# Patient Record
Sex: FEMALE | Race: WHITE | NOT HISPANIC OR LATINO
[De-identification: names, ages, dates, MRNs, and addresses within clinical notes are randomized per-mention and may not be internally consistent; named-entity substitution may affect disease eponyms.]

---

## 2018-08-29 PROBLEM — Z00.00 ENCOUNTER FOR PREVENTIVE HEALTH EXAMINATION: Status: ACTIVE | Noted: 2018-08-29

## 2018-09-13 ENCOUNTER — ASOB RESULT (OUTPATIENT)
Age: 28
End: 2018-09-13

## 2018-09-13 ENCOUNTER — APPOINTMENT (OUTPATIENT)
Dept: ANTEPARTUM | Facility: CLINIC | Age: 28
End: 2018-09-13
Payer: COMMERCIAL

## 2018-09-13 PROCEDURE — 76801 OB US < 14 WKS SINGLE FETUS: CPT

## 2018-09-13 PROCEDURE — 36416 COLLJ CAPILLARY BLOOD SPEC: CPT

## 2018-09-13 PROCEDURE — 76813 OB US NUCHAL MEAS 1 GEST: CPT

## 2018-09-17 ENCOUNTER — APPOINTMENT (OUTPATIENT)
Dept: ANTEPARTUM | Facility: CLINIC | Age: 28
End: 2018-09-17
Payer: COMMERCIAL

## 2018-09-17 ENCOUNTER — ASOB RESULT (OUTPATIENT)
Age: 28
End: 2018-09-17

## 2018-09-17 PROCEDURE — 99241 OFFICE CONSULTATION NEW/ESTAB PATIENT 15 MIN: CPT

## 2018-11-06 ENCOUNTER — OUTPATIENT (OUTPATIENT)
Dept: OUTPATIENT SERVICES | Age: 28
LOS: 1 days | Discharge: HOME | End: 2018-11-06

## 2018-11-07 ENCOUNTER — OTHER (OUTPATIENT)
Age: 28
End: 2018-11-07

## 2018-11-07 ENCOUNTER — APPOINTMENT (OUTPATIENT)
Dept: PEDIATRIC CARDIOLOGY | Facility: CLINIC | Age: 28
End: 2018-11-07
Payer: COMMERCIAL

## 2018-11-07 PROCEDURE — 76825 ECHO EXAM OF FETAL HEART: CPT

## 2018-11-07 PROCEDURE — 76820 UMBILICAL ARTERY ECHO: CPT

## 2018-11-07 PROCEDURE — 76827 ECHO EXAM OF FETAL HEART: CPT

## 2018-11-07 PROCEDURE — 99241 OFFICE CONSULTATION NEW/ESTAB PATIENT 15 MIN: CPT | Mod: 25

## 2018-11-07 PROCEDURE — 93325 DOPPLER ECHO COLOR FLOW MAPG: CPT | Mod: 59

## 2018-11-07 PROCEDURE — 76821 MIDDLE CEREBRAL ARTERY ECHO: CPT

## 2018-11-08 ENCOUNTER — APPOINTMENT (OUTPATIENT)
Dept: ANTEPARTUM | Facility: CLINIC | Age: 28
End: 2018-11-08
Payer: COMMERCIAL

## 2018-11-08 ENCOUNTER — ASOB RESULT (OUTPATIENT)
Age: 28
End: 2018-11-08

## 2018-11-08 PROCEDURE — 76811 OB US DETAILED SNGL FETUS: CPT

## 2018-11-08 PROCEDURE — 76817 TRANSVAGINAL US OBSTETRIC: CPT | Mod: 59

## 2019-01-31 ENCOUNTER — APPOINTMENT (OUTPATIENT)
Dept: ANTEPARTUM | Facility: CLINIC | Age: 29
End: 2019-01-31
Payer: COMMERCIAL

## 2019-01-31 ENCOUNTER — ASOB RESULT (OUTPATIENT)
Age: 29
End: 2019-01-31

## 2019-01-31 PROCEDURE — 76816 OB US FOLLOW-UP PER FETUS: CPT

## 2019-02-28 ENCOUNTER — APPOINTMENT (OUTPATIENT)
Dept: ANTEPARTUM | Facility: CLINIC | Age: 29
End: 2019-02-28
Payer: COMMERCIAL

## 2019-02-28 ENCOUNTER — ASOB RESULT (OUTPATIENT)
Age: 29
End: 2019-02-28

## 2019-02-28 PROCEDURE — 76816 OB US FOLLOW-UP PER FETUS: CPT

## 2019-03-01 ENCOUNTER — OUTPATIENT (OUTPATIENT)
Dept: OUTPATIENT SERVICES | Facility: HOSPITAL | Age: 29
LOS: 1 days | End: 2019-03-01
Payer: COMMERCIAL

## 2019-03-01 DIAGNOSIS — O26.899 OTHER SPECIFIED PREGNANCY RELATED CONDITIONS, UNSPECIFIED TRIMESTER: ICD-10-CM

## 2019-03-01 DIAGNOSIS — Z3A.00 WEEKS OF GESTATION OF PREGNANCY NOT SPECIFIED: ICD-10-CM

## 2019-03-01 PROCEDURE — 76818 FETAL BIOPHYS PROFILE W/NST: CPT | Mod: 26

## 2019-03-01 PROCEDURE — 59025 FETAL NON-STRESS TEST: CPT

## 2019-03-01 PROCEDURE — G0463: CPT

## 2019-03-01 PROCEDURE — 76818 FETAL BIOPHYS PROFILE W/NST: CPT

## 2019-03-01 PROCEDURE — 99283 EMERGENCY DEPT VISIT LOW MDM: CPT

## 2019-03-17 ENCOUNTER — TRANSCRIPTION ENCOUNTER (OUTPATIENT)
Age: 29
End: 2019-03-17

## 2019-03-18 ENCOUNTER — RESULT REVIEW (OUTPATIENT)
Age: 29
End: 2019-03-18

## 2019-03-18 ENCOUNTER — INPATIENT (INPATIENT)
Facility: HOSPITAL | Age: 29
LOS: 2 days | Discharge: HOME | End: 2019-03-21
Attending: OBSTETRICS & GYNECOLOGY | Admitting: OBSTETRICS & GYNECOLOGY
Payer: COMMERCIAL

## 2019-03-18 VITALS — WEIGHT: 158.73 LBS | HEIGHT: 61 IN

## 2019-03-18 DIAGNOSIS — Z3A.39 39 WEEKS GESTATION OF PREGNANCY: ICD-10-CM

## 2019-03-18 LAB
ABO RH CONFIRMATION: SIGNIFICANT CHANGE UP
ALBUMIN SERPL ELPH-MCNC: 2 G/DL — LOW (ref 3.5–5)
ALP SERPL-CCNC: 110 U/L — SIGNIFICANT CHANGE UP (ref 40–120)
ALT FLD-CCNC: 13 U/L DA — SIGNIFICANT CHANGE UP (ref 10–60)
ANION GAP SERPL CALC-SCNC: 9 MMOL/L — SIGNIFICANT CHANGE UP (ref 5–17)
APTT BLD: 24.8 SEC — LOW (ref 27.5–36.3)
APTT BLD: 25.3 SEC — LOW (ref 27.5–36.3)
AST SERPL-CCNC: 17 U/L — SIGNIFICANT CHANGE UP (ref 10–40)
BASOPHILS # BLD AUTO: 0.03 K/UL — SIGNIFICANT CHANGE UP (ref 0–0.2)
BASOPHILS NFR BLD AUTO: 0.3 % — SIGNIFICANT CHANGE UP (ref 0–2)
BILIRUB SERPL-MCNC: 0.3 MG/DL — SIGNIFICANT CHANGE UP (ref 0.2–1.2)
BUN SERPL-MCNC: 7 MG/DL — SIGNIFICANT CHANGE UP (ref 7–18)
CALCIUM SERPL-MCNC: 7.6 MG/DL — LOW (ref 8.4–10.5)
CHLORIDE SERPL-SCNC: 108 MMOL/L — SIGNIFICANT CHANGE UP (ref 96–108)
CO2 SERPL-SCNC: 22 MMOL/L — SIGNIFICANT CHANGE UP (ref 22–31)
CREAT SERPL-MCNC: 0.38 MG/DL — LOW (ref 0.5–1.3)
EOSINOPHIL # BLD AUTO: 0.1 K/UL — SIGNIFICANT CHANGE UP (ref 0–0.5)
EOSINOPHIL NFR BLD AUTO: 0.9 % — SIGNIFICANT CHANGE UP (ref 0–6)
FIBRINOGEN PPP-MCNC: 588 MG/DL — HIGH (ref 350–510)
FIBRINOGEN PPP-MCNC: 938 MG/DL — HIGH (ref 350–510)
GLUCOSE SERPL-MCNC: 95 MG/DL — SIGNIFICANT CHANGE UP (ref 70–99)
HCT VFR BLD CALC: 28.3 % — LOW (ref 34.5–45)
HCT VFR BLD CALC: 28.6 % — LOW (ref 34.5–45)
HCT VFR BLD CALC: 36.2 % — SIGNIFICANT CHANGE UP (ref 34.5–45)
HGB BLD-MCNC: 12.1 G/DL — SIGNIFICANT CHANGE UP (ref 11.5–15.5)
HGB BLD-MCNC: 9.5 G/DL — LOW (ref 11.5–15.5)
HGB BLD-MCNC: 9.6 G/DL — LOW (ref 11.5–15.5)
IMM GRANULOCYTES NFR BLD AUTO: 0.5 % — SIGNIFICANT CHANGE UP (ref 0–1.5)
INR BLD: 0.95 RATIO — SIGNIFICANT CHANGE UP (ref 0.88–1.16)
INR BLD: 0.99 RATIO — SIGNIFICANT CHANGE UP (ref 0.88–1.16)
LYMPHOCYTES # BLD AUTO: 1.39 K/UL — SIGNIFICANT CHANGE UP (ref 1–3.3)
LYMPHOCYTES # BLD AUTO: 12.3 % — LOW (ref 13–44)
MCHC RBC-ENTMCNC: 28.5 PG — SIGNIFICANT CHANGE UP (ref 27–34)
MCHC RBC-ENTMCNC: 28.7 PG — SIGNIFICANT CHANGE UP (ref 27–34)
MCHC RBC-ENTMCNC: 29 PG — SIGNIFICANT CHANGE UP (ref 27–34)
MCHC RBC-ENTMCNC: 33.4 GM/DL — SIGNIFICANT CHANGE UP (ref 32–36)
MCHC RBC-ENTMCNC: 33.6 GM/DL — SIGNIFICANT CHANGE UP (ref 32–36)
MCHC RBC-ENTMCNC: 33.6 GM/DL — SIGNIFICANT CHANGE UP (ref 32–36)
MCV RBC AUTO: 85.2 FL — SIGNIFICANT CHANGE UP (ref 80–100)
MCV RBC AUTO: 85.6 FL — SIGNIFICANT CHANGE UP (ref 80–100)
MCV RBC AUTO: 86.3 FL — SIGNIFICANT CHANGE UP (ref 80–100)
MONOCYTES # BLD AUTO: 0.47 K/UL — SIGNIFICANT CHANGE UP (ref 0–0.9)
MONOCYTES NFR BLD AUTO: 4.1 % — SIGNIFICANT CHANGE UP (ref 2–14)
NEUTROPHILS # BLD AUTO: 9.29 K/UL — HIGH (ref 1.8–7.4)
NEUTROPHILS NFR BLD AUTO: 81.9 % — HIGH (ref 43–77)
NRBC # BLD: 0 /100 WBCS — SIGNIFICANT CHANGE UP (ref 0–0)
PLATELET # BLD AUTO: 151 K/UL — SIGNIFICANT CHANGE UP (ref 150–400)
PLATELET # BLD AUTO: 177 K/UL — SIGNIFICANT CHANGE UP (ref 150–400)
PLATELET # BLD AUTO: 231 K/UL — SIGNIFICANT CHANGE UP (ref 150–400)
POTASSIUM SERPL-MCNC: 3.6 MMOL/L — SIGNIFICANT CHANGE UP (ref 3.5–5.3)
POTASSIUM SERPL-SCNC: 3.6 MMOL/L — SIGNIFICANT CHANGE UP (ref 3.5–5.3)
PROT SERPL-MCNC: 5.2 G/DL — LOW (ref 6–8.3)
PROTHROM AB SERPL-ACNC: 10.5 SEC — SIGNIFICANT CHANGE UP (ref 10–12.9)
PROTHROM AB SERPL-ACNC: 11 SEC — SIGNIFICANT CHANGE UP (ref 10–12.9)
RBC # BLD: 3.28 M/UL — LOW (ref 3.8–5.2)
RBC # BLD: 3.34 M/UL — LOW (ref 3.8–5.2)
RBC # BLD: 4.25 M/UL — SIGNIFICANT CHANGE UP (ref 3.8–5.2)
RBC # FLD: 14.3 % — SIGNIFICANT CHANGE UP (ref 10.3–14.5)
RBC # FLD: 14.6 % — HIGH (ref 10.3–14.5)
RBC # FLD: 14.7 % — HIGH (ref 10.3–14.5)
SODIUM SERPL-SCNC: 139 MMOL/L — SIGNIFICANT CHANGE UP (ref 135–145)
WBC # BLD: 11.34 K/UL — HIGH (ref 3.8–10.5)
WBC # BLD: 12.58 K/UL — HIGH (ref 3.8–10.5)
WBC # BLD: 19.15 K/UL — HIGH (ref 3.8–10.5)
WBC # FLD AUTO: 11.34 K/UL — HIGH (ref 3.8–10.5)
WBC # FLD AUTO: 12.58 K/UL — HIGH (ref 3.8–10.5)
WBC # FLD AUTO: 19.15 K/UL — HIGH (ref 3.8–10.5)

## 2019-03-18 PROCEDURE — 88307 TISSUE EXAM BY PATHOLOGIST: CPT | Mod: 26

## 2019-03-18 RX ORDER — HEPARIN SODIUM 5000 [USP'U]/ML
5000 INJECTION INTRAVENOUS; SUBCUTANEOUS EVERY 12 HOURS
Qty: 0 | Refills: 0 | Status: DISCONTINUED | OUTPATIENT
Start: 2019-03-18 | End: 2019-03-21

## 2019-03-18 RX ORDER — OXYTOCIN 10 UNIT/ML
41.67 VIAL (ML) INJECTION
Qty: 20 | Refills: 0 | Status: DISCONTINUED | OUTPATIENT
Start: 2019-03-18 | End: 2019-03-21

## 2019-03-18 RX ORDER — SODIUM CHLORIDE 9 MG/ML
1000 INJECTION, SOLUTION INTRAVENOUS
Qty: 0 | Refills: 0 | Status: DISCONTINUED | OUTPATIENT
Start: 2019-03-18 | End: 2019-03-19

## 2019-03-18 RX ORDER — ONDANSETRON 8 MG/1
4 TABLET, FILM COATED ORAL EVERY 6 HOURS
Qty: 0 | Refills: 0 | Status: DISCONTINUED | OUTPATIENT
Start: 2019-03-18 | End: 2019-03-21

## 2019-03-18 RX ORDER — CEFAZOLIN SODIUM 1 G
2000 VIAL (EA) INJECTION ONCE
Qty: 0 | Refills: 0 | Status: COMPLETED | OUTPATIENT
Start: 2019-03-18 | End: 2019-03-18

## 2019-03-18 RX ORDER — LANOLIN
1 OINTMENT (GRAM) TOPICAL
Qty: 0 | Refills: 0 | Status: DISCONTINUED | OUTPATIENT
Start: 2019-03-18 | End: 2019-03-21

## 2019-03-18 RX ORDER — ACETAMINOPHEN 500 MG
1000 TABLET ORAL ONCE
Qty: 0 | Refills: 0 | Status: COMPLETED | OUTPATIENT
Start: 2019-03-18 | End: 2019-03-18

## 2019-03-18 RX ORDER — KETOROLAC TROMETHAMINE 30 MG/ML
30 SYRINGE (ML) INJECTION EVERY 6 HOURS
Qty: 0 | Refills: 0 | Status: DISCONTINUED | OUTPATIENT
Start: 2019-03-18 | End: 2019-03-19

## 2019-03-18 RX ORDER — MORPHINE SULFATE 50 MG/1
0.2 CAPSULE, EXTENDED RELEASE ORAL ONCE
Qty: 0 | Refills: 0 | Status: DISCONTINUED | OUTPATIENT
Start: 2019-03-18 | End: 2019-03-21

## 2019-03-18 RX ORDER — GLYCERIN ADULT
1 SUPPOSITORY, RECTAL RECTAL AT BEDTIME
Qty: 0 | Refills: 0 | Status: DISCONTINUED | OUTPATIENT
Start: 2019-03-18 | End: 2019-03-21

## 2019-03-18 RX ORDER — OXYCODONE HYDROCHLORIDE 5 MG/1
10 TABLET ORAL
Qty: 0 | Refills: 0 | Status: DISCONTINUED | OUTPATIENT
Start: 2019-03-18 | End: 2019-03-20

## 2019-03-18 RX ORDER — NALOXONE HYDROCHLORIDE 4 MG/.1ML
0.1 SPRAY NASAL
Qty: 0 | Refills: 0 | Status: DISCONTINUED | OUTPATIENT
Start: 2019-03-18 | End: 2019-03-21

## 2019-03-18 RX ORDER — FERROUS SULFATE 325(65) MG
325 TABLET ORAL DAILY
Qty: 0 | Refills: 0 | Status: DISCONTINUED | OUTPATIENT
Start: 2019-03-18 | End: 2019-03-21

## 2019-03-18 RX ORDER — DIPHENHYDRAMINE HCL 50 MG
25 CAPSULE ORAL EVERY 6 HOURS
Qty: 0 | Refills: 0 | Status: DISCONTINUED | OUTPATIENT
Start: 2019-03-18 | End: 2019-03-21

## 2019-03-18 RX ORDER — CARBOPROST TROMETHAMINE 250 UG/ML
250 INJECTION, SOLUTION INTRAMUSCULAR ONCE
Qty: 0 | Refills: 0 | Status: COMPLETED | OUTPATIENT
Start: 2019-03-18 | End: 2019-03-18

## 2019-03-18 RX ORDER — CITRIC ACID/SODIUM CITRATE 300-500 MG
30 SOLUTION, ORAL ORAL ONCE
Qty: 0 | Refills: 0 | Status: COMPLETED | OUTPATIENT
Start: 2019-03-18 | End: 2019-03-18

## 2019-03-18 RX ORDER — METOCLOPRAMIDE HCL 10 MG
10 TABLET ORAL ONCE
Qty: 0 | Refills: 0 | Status: DISCONTINUED | OUTPATIENT
Start: 2019-03-18 | End: 2019-03-21

## 2019-03-18 RX ORDER — SIMETHICONE 80 MG/1
80 TABLET, CHEWABLE ORAL EVERY 4 HOURS
Qty: 0 | Refills: 0 | Status: DISCONTINUED | OUTPATIENT
Start: 2019-03-18 | End: 2019-03-21

## 2019-03-18 RX ORDER — TETANUS TOXOID, REDUCED DIPHTHERIA TOXOID AND ACELLULAR PERTUSSIS VACCINE, ADSORBED 5; 2.5; 8; 8; 2.5 [IU]/.5ML; [IU]/.5ML; UG/.5ML; UG/.5ML; UG/.5ML
0.5 SUSPENSION INTRAMUSCULAR ONCE
Qty: 0 | Refills: 0 | Status: DISCONTINUED | OUTPATIENT
Start: 2019-03-18 | End: 2019-03-21

## 2019-03-18 RX ORDER — OXYCODONE HYDROCHLORIDE 5 MG/1
5 TABLET ORAL
Qty: 0 | Refills: 0 | Status: DISCONTINUED | OUTPATIENT
Start: 2019-03-18 | End: 2019-03-20

## 2019-03-18 RX ORDER — OXYTOCIN 10 UNIT/ML
20 VIAL (ML) INJECTION ONCE
Qty: 0 | Refills: 0 | Status: COMPLETED | OUTPATIENT
Start: 2019-03-18 | End: 2019-03-18

## 2019-03-18 RX ORDER — SODIUM CHLORIDE 9 MG/ML
1000 INJECTION, SOLUTION INTRAVENOUS ONCE
Qty: 0 | Refills: 0 | Status: COMPLETED | OUTPATIENT
Start: 2019-03-18 | End: 2019-03-18

## 2019-03-18 RX ORDER — CEFAZOLIN SODIUM 1 G
2000 VIAL (EA) INJECTION EVERY 12 HOURS
Qty: 0 | Refills: 0 | Status: COMPLETED | OUTPATIENT
Start: 2019-03-18 | End: 2019-03-20

## 2019-03-18 RX ORDER — DOCUSATE SODIUM 100 MG
100 CAPSULE ORAL
Qty: 0 | Refills: 0 | Status: DISCONTINUED | OUTPATIENT
Start: 2019-03-18 | End: 2019-03-21

## 2019-03-18 RX ADMIN — Medication 30 MILLIGRAM(S): at 23:30

## 2019-03-18 RX ADMIN — Medication 30 MILLIGRAM(S): at 16:29

## 2019-03-18 RX ADMIN — Medication 30 MILLILITER(S): at 12:47

## 2019-03-18 RX ADMIN — Medication 0.2 MILLIGRAM(S): at 14:01

## 2019-03-18 RX ADMIN — SODIUM CHLORIDE 2000 MILLILITER(S): 9 INJECTION, SOLUTION INTRAVENOUS at 10:52

## 2019-03-18 RX ADMIN — Medication 100 MILLIGRAM(S): at 19:00

## 2019-03-18 RX ADMIN — ONDANSETRON 4 MILLIGRAM(S): 8 TABLET, FILM COATED ORAL at 17:21

## 2019-03-18 RX ADMIN — Medication 400 MILLIGRAM(S): at 15:42

## 2019-03-18 RX ADMIN — SODIUM CHLORIDE 125 MILLILITER(S): 9 INJECTION, SOLUTION INTRAVENOUS at 15:49

## 2019-03-18 RX ADMIN — CARBOPROST TROMETHAMINE 250 MICROGRAM(S): 250 INJECTION, SOLUTION INTRAMUSCULAR at 14:15

## 2019-03-18 RX ADMIN — Medication 1000 MILLIGRAM(S): at 16:00

## 2019-03-18 RX ADMIN — Medication 125 MILLIUNIT(S)/MIN: at 15:15

## 2019-03-18 RX ADMIN — Medication 30 MILLIGRAM(S): at 15:58

## 2019-03-18 RX ADMIN — Medication 20 UNIT(S): at 13:57

## 2019-03-18 RX ADMIN — SODIUM CHLORIDE 125 MILLILITER(S): 9 INJECTION, SOLUTION INTRAVENOUS at 11:58

## 2019-03-18 RX ADMIN — Medication 100 MILLIGRAM(S): at 13:08

## 2019-03-18 NOTE — CHART NOTE - NSCHARTNOTEFT_GEN_A_CORE
code fusion called after patient had EBL of 1000cc intra op during c Section. patient was seen in the OR by Dr Schneider. Vitals stable. As of now, patient does not need transfusion per OB team. code fusion called after patient had EBL of 1000cc intra op during c Section. patient was seen in the OR by Dr Schneider. Vitals stable. As of now, patient does not need transfusion per OB team...

## 2019-03-18 NOTE — CHART NOTE - NSCHARTNOTEFT_GEN_A_CORE
Patient seen at bedside, resting comfortably offers no new complaints. Due to ambulate, coles to be removed and due to void, tolerating clear diet at this time.  Attempting breastfeeding.  Denies HA, CP, SOB, N/V/D, dizziness, palpitations, worsening abdominal pain, worsening vaginal bleeding, or any other concerns.      Vital Signs Last 24 Hrs  T(C): 36.8 (18 Mar 2019 17:49), Max: 37 (18 Mar 2019 17:30)  T(F): 98.2 (18 Mar 2019 17:49), Max: 98.6 (18 Mar 2019 17:30)  HR: 88 (18 Mar 2019 17:49) (69 - 102)  BP: 112/67 (18 Mar 2019 17:49) (81/66 - 121/62)  BP(mean): 70 (18 Mar 2019 16:00) (69 - 79)  RR: 20 (18 Mar 2019 17:49) (18 - 23)  SpO2: 97% (18 Mar 2019 17:49) (97% - 100%)    Gen: A&O x 3, NAD   Abdomen: +BS; soft; NT; ND; Dressing C/D/I  Gyn: Minimal lochia  Ext: Nontender, DTRS 2+, no worsening edema, venodynes intact                          9.6    19.15 )-----------( 177      ( 18 Mar 2019 19:12 )             28.6       A/P: POD #0 s/p c/s       -Pain management as needed  -Advance as per protocol  -OOB and ambulate  -f/u Rpt CBC   -DC coles f/u void  -Advance diet with flatus  -Encourage breastfeeding   -d/w Dr. stokes

## 2019-03-19 ENCOUNTER — TRANSCRIPTION ENCOUNTER (OUTPATIENT)
Age: 29
End: 2019-03-19

## 2019-03-19 DIAGNOSIS — D62 ACUTE POSTHEMORRHAGIC ANEMIA: ICD-10-CM

## 2019-03-19 LAB
BASOPHILS # BLD AUTO: 0.02 K/UL — SIGNIFICANT CHANGE UP (ref 0–0.2)
BASOPHILS NFR BLD AUTO: 0.1 % — SIGNIFICANT CHANGE UP (ref 0–2)
EOSINOPHIL # BLD AUTO: 0 K/UL — SIGNIFICANT CHANGE UP (ref 0–0.5)
EOSINOPHIL NFR BLD AUTO: 0 % — SIGNIFICANT CHANGE UP (ref 0–6)
HCT VFR BLD CALC: 23.2 % — LOW (ref 34.5–45)
HCT VFR BLD CALC: 28.5 % — LOW (ref 34.5–45)
HGB BLD-MCNC: 7.7 G/DL — LOW (ref 11.5–15.5)
HGB BLD-MCNC: 9.7 G/DL — LOW (ref 11.5–15.5)
IMM GRANULOCYTES NFR BLD AUTO: 0.4 % — SIGNIFICANT CHANGE UP (ref 0–1.5)
LYMPHOCYTES # BLD AUTO: 1.66 K/UL — SIGNIFICANT CHANGE UP (ref 1–3.3)
LYMPHOCYTES # BLD AUTO: 10.6 % — LOW (ref 13–44)
MCHC RBC-ENTMCNC: 28.4 PG — SIGNIFICANT CHANGE UP (ref 27–34)
MCHC RBC-ENTMCNC: 29.6 PG — SIGNIFICANT CHANGE UP (ref 27–34)
MCHC RBC-ENTMCNC: 33.2 GM/DL — SIGNIFICANT CHANGE UP (ref 32–36)
MCHC RBC-ENTMCNC: 34 GM/DL — SIGNIFICANT CHANGE UP (ref 32–36)
MCV RBC AUTO: 85.6 FL — SIGNIFICANT CHANGE UP (ref 80–100)
MCV RBC AUTO: 86.9 FL — SIGNIFICANT CHANGE UP (ref 80–100)
MONOCYTES # BLD AUTO: 0.86 K/UL — SIGNIFICANT CHANGE UP (ref 0–0.9)
MONOCYTES NFR BLD AUTO: 5.5 % — SIGNIFICANT CHANGE UP (ref 2–14)
NEUTROPHILS # BLD AUTO: 13.05 K/UL — HIGH (ref 1.8–7.4)
NEUTROPHILS NFR BLD AUTO: 83.4 % — HIGH (ref 43–77)
NRBC # BLD: 0 /100 WBCS — SIGNIFICANT CHANGE UP (ref 0–0)
NRBC # BLD: 0 /100 WBCS — SIGNIFICANT CHANGE UP (ref 0–0)
PLATELET # BLD AUTO: 165 K/UL — SIGNIFICANT CHANGE UP (ref 150–400)
PLATELET # BLD AUTO: 190 K/UL — SIGNIFICANT CHANGE UP (ref 150–400)
RBC # BLD: 2.71 M/UL — LOW (ref 3.8–5.2)
RBC # BLD: 3.28 M/UL — LOW (ref 3.8–5.2)
RBC # FLD: 14.3 % — SIGNIFICANT CHANGE UP (ref 10.3–14.5)
RBC # FLD: 14.8 % — HIGH (ref 10.3–14.5)
T PALLIDUM AB TITR SER: NEGATIVE — SIGNIFICANT CHANGE UP
WBC # BLD: 12.16 K/UL — HIGH (ref 3.8–10.5)
WBC # BLD: 15.66 K/UL — HIGH (ref 3.8–10.5)
WBC # FLD AUTO: 12.16 K/UL — HIGH (ref 3.8–10.5)
WBC # FLD AUTO: 15.66 K/UL — HIGH (ref 3.8–10.5)

## 2019-03-19 RX ORDER — IBUPROFEN 200 MG
600 TABLET ORAL EVERY 6 HOURS
Qty: 0 | Refills: 0 | Status: DISCONTINUED | OUTPATIENT
Start: 2019-03-19 | End: 2019-03-21

## 2019-03-19 RX ORDER — IRON SUCROSE 20 MG/ML
100 INJECTION, SOLUTION INTRAVENOUS EVERY 24 HOURS
Qty: 0 | Refills: 0 | Status: COMPLETED | OUTPATIENT
Start: 2019-03-19 | End: 2019-03-21

## 2019-03-19 RX ORDER — FOLIC ACID 0.8 MG
1 TABLET ORAL DAILY
Qty: 0 | Refills: 0 | Status: DISCONTINUED | OUTPATIENT
Start: 2019-03-19 | End: 2019-03-21

## 2019-03-19 RX ORDER — ASCORBIC ACID 60 MG
500 TABLET,CHEWABLE ORAL DAILY
Qty: 0 | Refills: 0 | Status: DISCONTINUED | OUTPATIENT
Start: 2019-03-19 | End: 2019-03-21

## 2019-03-19 RX ORDER — DIPHENHYDRAMINE HCL 50 MG
25 CAPSULE ORAL ONCE
Qty: 0 | Refills: 0 | Status: COMPLETED | OUTPATIENT
Start: 2019-03-19 | End: 2019-03-19

## 2019-03-19 RX ORDER — ACETAMINOPHEN 500 MG
975 TABLET ORAL ONCE
Qty: 0 | Refills: 0 | Status: COMPLETED | OUTPATIENT
Start: 2019-03-19 | End: 2019-03-19

## 2019-03-19 RX ADMIN — HEPARIN SODIUM 5000 UNIT(S): 5000 INJECTION INTRAVENOUS; SUBCUTANEOUS at 12:26

## 2019-03-19 RX ADMIN — Medication 325 MILLIGRAM(S): at 12:26

## 2019-03-19 RX ADMIN — Medication 30 MILLIGRAM(S): at 21:20

## 2019-03-19 RX ADMIN — Medication 30 MILLIGRAM(S): at 00:05

## 2019-03-19 RX ADMIN — Medication 975 MILLIGRAM(S): at 11:08

## 2019-03-19 RX ADMIN — Medication 30 MILLIGRAM(S): at 06:42

## 2019-03-19 RX ADMIN — HEPARIN SODIUM 5000 UNIT(S): 5000 INJECTION INTRAVENOUS; SUBCUTANEOUS at 01:06

## 2019-03-19 RX ADMIN — Medication 100 MILLIGRAM(S): at 07:03

## 2019-03-19 RX ADMIN — Medication 500 MILLIGRAM(S): at 12:26

## 2019-03-19 RX ADMIN — Medication 1 TABLET(S): at 12:26

## 2019-03-19 RX ADMIN — Medication 30 MILLIGRAM(S): at 22:19

## 2019-03-19 RX ADMIN — Medication 30 MILLIGRAM(S): at 07:42

## 2019-03-19 RX ADMIN — Medication 975 MILLIGRAM(S): at 10:43

## 2019-03-19 RX ADMIN — IRON SUCROSE 210 MILLIGRAM(S): 20 INJECTION, SOLUTION INTRAVENOUS at 11:08

## 2019-03-19 RX ADMIN — Medication 1 MILLIGRAM(S): at 12:26

## 2019-03-19 RX ADMIN — Medication 100 MILLIGRAM(S): at 18:32

## 2019-03-19 RX ADMIN — Medication 25 MILLIGRAM(S): at 10:43

## 2019-03-19 NOTE — DISCHARGE NOTE OB - CARE PROVIDER_API CALL
Julius Celaya)  Obstetrics and Gynecology  [address] [place] [address]  Phone: [phone]  Fax: [phone]  Follow Up Time:

## 2019-03-19 NOTE — DISCHARGE NOTE OB - PLAN OF CARE
Continue breastfeeding.  Motrin as needed for pain.  Ambulate daily.  No heavy lifting or anything per vagina x 6 weeks - no sex, tampons, douching, tub baths, etc.  Follow up in office in 2 weeks for incision check, and then at 6 weeks for postpartum check. take iron, folic acid, vitamin C, and prenatal vitamins. eat iron fortified food

## 2019-03-19 NOTE — DISCHARGE NOTE OB - MATERIALS PROVIDED
[facility] [place] Screening Program/Back To Sleep Handout/Breastfeeding Mother’s Support Group Information/Vaccinations/Breastfeeding Guide and Packet/[place]  Immunization Record/Guide to Postpartum Care

## 2019-03-19 NOTE — DISCHARGE NOTE OB - CARE PLAN
Principal Discharge DX:	 delivery delivered  Goal:	Continue breastfeeding.  Motrin as needed for pain.  Ambulate daily.  No heavy lifting or anything per vagina x 6 weeks - no sex, tampons, douching, tub baths, etc.  Follow up in office in 2 weeks for incision check, and then at 6 weeks for postpartum check.  Assessment and plan of treatment:	Continue breastfeeding.  Motrin as needed for pain.  Ambulate daily.  No heavy lifting or anything per vagina x 6 weeks - no sex, tampons, douching, tub baths, etc.  Follow up in office in 2 weeks for incision check, and then at 6 weeks for postpartum check.  Secondary Diagnosis:	Postoperative anemia due to acute blood loss  Assessment and plan of treatment:	take iron, folic acid, vitamin C, and prenatal vitamins. eat iron fortified food

## 2019-03-19 NOTE — PROGRESS NOTE ADULT - NSICDXPROBLEM_GEN_ALL_CORE_FT
PROBLEM DIAGNOSES  Problem: Postoperative anemia due to acute blood loss  Assessment and Plan: -f/u Rpt CBC   -orthostatic vs  -feso4/vitC/folic acid/pnv      Problem:  delivery delivered  Assessment and Plan: -Pain management as needed  -OOB and ambulate  -f/u Rpt CBC   -orthostatic vs  -feso4/vitC/folic acid/pnv  - f/u void  -Advance diet to regular  -Encourage breastfeeding   -d/w Dr Celaya

## 2019-03-19 NOTE — DISCHARGE NOTE OB - PATIENT PORTAL LINK FT
You can access the [facility] Patient Portal, offered by [facility], by registering with the following website: [email or web address]

## 2019-03-19 NOTE — PROGRESS NOTE ADULT - SUBJECTIVE AND OBJECTIVE BOX
Patient seen at bedside resting comfortably offers no new complaints. not yet ambulating, coles just removed no void spontaneously yet.  + flatus;  no bm; tolerating clr liq diet. both breast and bottle feeding. Denies HA, blurry vision or epigastric pain, CP, SOB, N/V/D,  dizziness, palpitations, worsening vaginal bleeding.    Vital Signs Last 24 Hrs  T(C): 37.3 (19 Mar 2019 05:42), Max: 37.3 (19 Mar 2019 05:42)  T(F): 99.2 (19 Mar 2019 05:42), Max: 99.2 (19 Mar 2019 05:42)  HR: 92 (19 Mar 2019 05:42) (69 - 102)  BP: 115/60 (19 Mar 2019 05:42) (81/66 - 121/62)  BP(mean): 70 (18 Mar 2019 16:00) (69 - 79)  RR: 18 (19 Mar 2019 05:42) (17 - 23)  SpO2: 98% (19 Mar 2019 05:42) (97% - 100%)    Gen: A&O x 3, NAD  Chest: CTA B/L  Cardiac: S1,S2  RRR  Breast: Soft, nontender, nonengorged  Abdomen: +BS; soft; Nontender, nondistended; dressing removed incision C/D/I steri strips in place  Gyn: minimal lochia   Extremities: Nontender, venodynes in place                          7.7    15.66 )-----------( 190      ( 19 Mar 2019 06:57 )             23.2       A/P: POD #1 s/p scheduled primary c/s on maternal demand; w acute blood loss anemia; asymptomatic   -Pain management as needed  -OOB and ambulate  -f/u Rpt CBC   -orthostatic vs  -feso4/vitC/folic acid/pnv  - f/u void  -Advance diet to regular  -Encourage breastfeeding   -d/w Dr Celaya

## 2019-03-19 NOTE — DISCHARGE NOTE OB - HOSPITAL COURSE
27yo  @39.2wks s/p scheduled primary c/s on maternal demand; had code fusion recieved 2units PRBC had normal postop care

## 2019-03-20 RX ORDER — DOCUSATE SODIUM 100 MG
1 CAPSULE ORAL
Qty: 60 | Refills: 0 | OUTPATIENT
Start: 2019-03-20 | End: 2019-04-18

## 2019-03-20 RX ORDER — IBUPROFEN 200 MG
1 TABLET ORAL
Qty: 120 | Refills: 0 | OUTPATIENT
Start: 2019-03-20 | End: 2019-04-18

## 2019-03-20 RX ORDER — OXYCODONE AND ACETAMINOPHEN 5; 325 MG/1; MG/1
2 TABLET ORAL EVERY 4 HOURS
Qty: 0 | Refills: 0 | Status: DISCONTINUED | OUTPATIENT
Start: 2019-03-20 | End: 2019-03-21

## 2019-03-20 RX ORDER — OXYCODONE AND ACETAMINOPHEN 5; 325 MG/1; MG/1
1 TABLET ORAL EVERY 4 HOURS
Qty: 0 | Refills: 0 | Status: DISCONTINUED | OUTPATIENT
Start: 2019-03-20 | End: 2019-03-21

## 2019-03-20 RX ORDER — FERROUS SULFATE 325(65) MG
1 TABLET ORAL
Qty: 90 | Refills: 0 | OUTPATIENT
Start: 2019-03-20 | End: 2019-04-18

## 2019-03-20 RX ADMIN — Medication 600 MILLIGRAM(S): at 02:19

## 2019-03-20 RX ADMIN — HEPARIN SODIUM 5000 UNIT(S): 5000 INJECTION INTRAVENOUS; SUBCUTANEOUS at 12:38

## 2019-03-20 RX ADMIN — Medication 500 MILLIGRAM(S): at 11:46

## 2019-03-20 RX ADMIN — OXYCODONE AND ACETAMINOPHEN 1 TABLET(S): 5; 325 TABLET ORAL at 17:25

## 2019-03-20 RX ADMIN — HEPARIN SODIUM 5000 UNIT(S): 5000 INJECTION INTRAVENOUS; SUBCUTANEOUS at 01:45

## 2019-03-20 RX ADMIN — Medication 600 MILLIGRAM(S): at 16:53

## 2019-03-20 RX ADMIN — OXYCODONE AND ACETAMINOPHEN 1 TABLET(S): 5; 325 TABLET ORAL at 16:51

## 2019-03-20 RX ADMIN — IRON SUCROSE 210 MILLIGRAM(S): 20 INJECTION, SOLUTION INTRAVENOUS at 08:35

## 2019-03-20 RX ADMIN — Medication 600 MILLIGRAM(S): at 09:10

## 2019-03-20 RX ADMIN — Medication 600 MILLIGRAM(S): at 17:25

## 2019-03-20 RX ADMIN — Medication 1 MILLIGRAM(S): at 11:45

## 2019-03-20 RX ADMIN — Medication 600 MILLIGRAM(S): at 08:26

## 2019-03-20 RX ADMIN — Medication 325 MILLIGRAM(S): at 11:46

## 2019-03-20 RX ADMIN — Medication 600 MILLIGRAM(S): at 01:45

## 2019-03-20 RX ADMIN — OXYCODONE AND ACETAMINOPHEN 1 TABLET(S): 5; 325 TABLET ORAL at 11:46

## 2019-03-20 RX ADMIN — OXYCODONE AND ACETAMINOPHEN 1 TABLET(S): 5; 325 TABLET ORAL at 12:20

## 2019-03-20 RX ADMIN — Medication 100 MILLIGRAM(S): at 06:26

## 2019-03-20 RX ADMIN — Medication 1 TABLET(S): at 11:45

## 2019-03-20 NOTE — PROGRESS NOTE ADULT - PROBLEM SELECTOR PLAN 1
A/P: 27y/o POD #2 s/p scheduled primary c.s on maternal demand@39 weeks c/b code fusion s/p 2units PRBCs, stable   -Pain management, postop care  -OOB and ambulate  - f/u Rpt CBC   -encourage incentive spirometer use  -Encourage breastfeeding   -d/w Dr. Celaya A/P: 29y/o POD #2 s/p scheduled primary c.s on maternal demand@39 weeks c/b code fusion s/p 2units PRBCs, stable   -Pain management, postop care  -OOB and ambulate  - continue venofer  -encourage incentive spirometer use  -Encourage breastfeeding   -d/w Dr. Celaya

## 2019-03-20 NOTE — PROGRESS NOTE ADULT - SUBJECTIVE AND OBJECTIVE BOX
OB PA Progress Note POD#2    Patient seen at bedside resting comfortably offers no new complaints. + Ambulation, + void without difficulty, + flatus; tolerating regular diet. both breastfeeding and bottle feeding. Denies HA, CP, SOB, N/V/D,  no bm; dizziness, palpitations, worsening abdominal pain, worsening vaginal bleeding, or any other concerns.   Vital Signs Last 24 Hrs  T(C): 36.6 (20 Mar 2019 06:39), Max: 37.3 (19 Mar 2019 23:44)  T(F): 97.9 (20 Mar 2019 06:39), Max: 99.1 (19 Mar 2019 23:44)  HR: 77 (20 Mar 2019 06:39) (77 - 102)  BP: 109/67 (20 Mar 2019 06:39) (92/62 - 110/74)  BP(mean): 86 (19 Mar 2019 19:40) (74 - 86)  RR: 16 (20 Mar 2019 06:39) (16 - 18)  SpO2: 97% (20 Mar 2019 06:39) (97% - 100%)    Gen: A&O x 3, NAD  Chest: CTABL  Cardiac: S1+S2+ RRR  Breast: Soft, nontender, nonengorged  Abdomen: +BS; soft; Nontender, nondistended, fundus firm, Incision C/D/I steri strips in place   Gyn: Minimal lochia  Extremities: Nontender, DTRS 2+, no worsening edema                        9.7    12.16 )-----------( 165      ( 19 Mar 2019 23:17 )             28.5

## 2019-03-20 NOTE — PROGRESS NOTE ADULT - ASSESSMENT
A/P: 29y/o POD #2 s/p scheduled primary c.s on maternal demand@39 weeks c/b code fusion s/p 2units PRBCs, stable   -Pain management, postop care  -OOB and ambulate  - f/u Rpt CBC   -encourage incentive spirometer use  -Encourage breastfeeding   -d/w Dr. Celaya A/P: 29y/o POD #2 s/p scheduled primary c.s on maternal demand@39 weeks c/b code fusion s/p 2units PRBCs, stable   -Pain management, postop care  -OOB and ambulate  -continue venofer  -encourage incentive spirometer use  -Encourage breastfeeding   -d/w Dr. Celaya

## 2019-03-21 VITALS
HEART RATE: 77 BPM | SYSTOLIC BLOOD PRESSURE: 100 MMHG | RESPIRATION RATE: 16 BRPM | DIASTOLIC BLOOD PRESSURE: 63 MMHG | OXYGEN SATURATION: 97 % | TEMPERATURE: 98 F

## 2019-03-21 PROCEDURE — 36415 COLL VENOUS BLD VENIPUNCTURE: CPT

## 2019-03-21 PROCEDURE — 86850 RBC ANTIBODY SCREEN: CPT

## 2019-03-21 PROCEDURE — 85027 COMPLETE CBC AUTOMATED: CPT

## 2019-03-21 PROCEDURE — 80053 COMPREHEN METABOLIC PANEL: CPT

## 2019-03-21 PROCEDURE — 59050 FETAL MONITOR W/REPORT: CPT

## 2019-03-21 PROCEDURE — 86780 TREPONEMA PALLIDUM: CPT

## 2019-03-21 PROCEDURE — 86901 BLOOD TYPING SEROLOGIC RH(D): CPT

## 2019-03-21 PROCEDURE — P9040: CPT

## 2019-03-21 PROCEDURE — 88307 TISSUE EXAM BY PATHOLOGIST: CPT

## 2019-03-21 PROCEDURE — 85610 PROTHROMBIN TIME: CPT

## 2019-03-21 PROCEDURE — 86923 COMPATIBILITY TEST ELECTRIC: CPT

## 2019-03-21 PROCEDURE — 85384 FIBRINOGEN ACTIVITY: CPT

## 2019-03-21 PROCEDURE — 86900 BLOOD TYPING SEROLOGIC ABO: CPT

## 2019-03-21 PROCEDURE — 36430 TRANSFUSION BLD/BLD COMPNT: CPT

## 2019-03-21 PROCEDURE — 85730 THROMBOPLASTIN TIME PARTIAL: CPT

## 2019-03-21 PROCEDURE — C1889: CPT

## 2019-03-21 RX ADMIN — OXYCODONE AND ACETAMINOPHEN 1 TABLET(S): 5; 325 TABLET ORAL at 08:05

## 2019-03-21 RX ADMIN — Medication 325 MILLIGRAM(S): at 11:19

## 2019-03-21 RX ADMIN — HEPARIN SODIUM 5000 UNIT(S): 5000 INJECTION INTRAVENOUS; SUBCUTANEOUS at 00:16

## 2019-03-21 RX ADMIN — IRON SUCROSE 210 MILLIGRAM(S): 20 INJECTION, SOLUTION INTRAVENOUS at 08:41

## 2019-03-21 RX ADMIN — OXYCODONE AND ACETAMINOPHEN 1 TABLET(S): 5; 325 TABLET ORAL at 07:33

## 2019-03-21 RX ADMIN — Medication 1 TABLET(S): at 11:17

## 2019-03-21 RX ADMIN — Medication 600 MILLIGRAM(S): at 11:17

## 2019-03-21 RX ADMIN — Medication 600 MILLIGRAM(S): at 03:41

## 2019-03-21 RX ADMIN — Medication 1 MILLIGRAM(S): at 11:17

## 2019-03-21 RX ADMIN — OXYCODONE AND ACETAMINOPHEN 2 TABLET(S): 5; 325 TABLET ORAL at 00:45

## 2019-03-21 RX ADMIN — Medication 500 MILLIGRAM(S): at 11:17

## 2019-03-21 RX ADMIN — SIMETHICONE 80 MILLIGRAM(S): 80 TABLET, CHEWABLE ORAL at 11:19

## 2019-03-21 RX ADMIN — OXYCODONE AND ACETAMINOPHEN 2 TABLET(S): 5; 325 TABLET ORAL at 00:14

## 2019-03-21 RX ADMIN — Medication 600 MILLIGRAM(S): at 04:15

## 2019-03-21 NOTE — PROGRESS NOTE ADULT - ASSESSMENT
A/P:  28y POD # 3 s/p primary  @39.2 weeks, code fusion s/p 2 units prbcs, currently in stable condition  --Discharge instructions given.  --Patient verbalized understanding A/P:  28y POD # 3 s/p primary  @39.2 weeks, code fusion s/p 2 units prbcs, currently in stable condition  --Discharge instructions given.  --Patient verbalized understanding  -- patient request additional pain management upon discharge. Dr Celaya made aware. Pain mgmt notified to evaluate patient

## 2019-03-21 NOTE — PROGRESS NOTE ADULT - SUBJECTIVE AND OBJECTIVE BOX
OBGYN PA NOTE POD3  Patient seen and evaluated at bedside,  resting comfortably w/o any acute  complaints.  Denies fever, HA, CP, SOB, N/V/D, dizziness, palpitations, worsening abdominal pain, worsening vaginal bleeding, or any other concerns.  Ambulating and voiding without difficulty.  Passing flatus and tolerating regular diet.  Attempting to breastfeed.     Vital Signs Last 24 Hrs  T(C): 36.6 (21 Mar 2019 05:36), Max: 36.7 (20 Mar 2019 14:24)  T(F): 97.9 (21 Mar 2019 05:36), Max: 98.1 (20 Mar 2019 14:24)  HR: 77 (21 Mar 2019 05:36) (77 - 83)  BP: 100/63 (21 Mar 2019 05:36) (100/63 - 109/66)  BP(mean): --  RR: 16 (21 Mar 2019 05:36) (16 - 16)  SpO2: 97% (21 Mar 2019 05:36) (97% - 97%)    Physical Exam:     Gen: A&Ox 3, NAD  Chest: CTAB/L  Cardiac: S1+S2+ RRR  Breast: Soft, nontender, nonengorged  Abdomen: Soft, nontender, Fundus firm below umbilicus, +BS. incision clean, dry and intact  Gyn: Mild lochia,   Extremities: Nontender, DTRS 2+, no worsening edema                          9.7    12.16 )-----------( 165      ( 19 Mar 2019 23:17 )             28.5       A/P:  28y POD # 3 s/p primary  @39.2 weeks, code fusion s/p 2 units prbcs, currently in stable condition  --Discharge instructions given.  --Patient verbalized understanding  d/w DR Celaya OBGYN PA NOTE POD3  Patient seen and evaluated at bedside,  resting comfortably w/o any acute  complaints.  Denies fever, HA, CP, SOB, N/V/D, dizziness, palpitations, worsening abdominal pain, worsening vaginal bleeding, or any other concerns.  Ambulating and voiding without difficulty.  Passing flatus and tolerating regular diet.  Attempting to breastfeed.     Vital Signs Last 24 Hrs  T(C): 36.6 (21 Mar 2019 05:36), Max: 36.7 (20 Mar 2019 14:24)  T(F): 97.9 (21 Mar 2019 05:36), Max: 98.1 (20 Mar 2019 14:24)  HR: 77 (21 Mar 2019 05:36) (77 - 83)  BP: 100/63 (21 Mar 2019 05:36) (100/63 - 109/66)  BP(mean): --  RR: 16 (21 Mar 2019 05:36) (16 - 16)  SpO2: 97% (21 Mar 2019 05:36) (97% - 97%)    Physical Exam:     Gen: A&Ox 3, NAD  Chest: CTAB/L  Cardiac: S1+S2+ RRR  Breast: Soft, nontender, nonengorged  Abdomen: Soft, nontender, Fundus firm below umbilicus, +BS. incision clean, dry and intact  Gyn: Mild lochia,   Extremities: Nontender, DTRS 2+, no worsening edema                          9.7    12.16 )-----------( 165      ( 19 Mar 2019 23:17 )             28.5       A/P:  28y POD # 3 s/p primary  @39.2 weeks, code fusion s/p 2 units prbcs, currently in stable condition  --Discharge instructions given.  --Patient verbalized understanding  -- patient request additional pain management upon discharge. Dr Celaya made aware. Pain mgmt notified to evaluate patient  d/w DR Celaya

## 2020-02-07 NOTE — PATIENT PROFILE OB - PURPOSEFUL PROACTIVE ROUNDING
----- Message from Shauna Randhawa MD sent at 2/6/2020  1:08 PM CST -----  Patient has osteopenia.  Will discuss on her follow-up appointment in March please add to reason for visit.   Patient

## 2020-07-22 NOTE — PATIENT PROFILE OB - BABY A: COMPLICATIONS, DELIVERY
none Benzoyl Peroxide Counseling: Patient counseled that medicine may cause skin irritation and bleach clothing.  In the event of skin irritation, the patient was advised to reduce the amount of the drug applied or use it less frequently.   The patient verbalized understanding of the proper use and possible adverse effects of benzoyl peroxide.  All of the patient's questions and concerns were addressed.

## 2024-03-30 NOTE — CHART NOTE - NSCHARTNOTEFT_GEN_A_CORE
Notified Dr Celaya of pt orthostatic VS; instructed to transfuse 2units PRBC  Pt agreed and signed consent  Benadryl/Tylenol ordered  will cont close monitor Imaging Studies/Medications